# Patient Record
Sex: FEMALE | Race: WHITE | NOT HISPANIC OR LATINO | URBAN - METROPOLITAN AREA
[De-identification: names, ages, dates, MRNs, and addresses within clinical notes are randomized per-mention and may not be internally consistent; named-entity substitution may affect disease eponyms.]

---

## 2022-05-12 ENCOUNTER — TRANSCRIBE ORDERS (OUTPATIENT)
Dept: PHYSICAL THERAPY | Facility: HOSPITAL | Age: 57
End: 2022-05-12

## 2022-05-12 DIAGNOSIS — I89.0 LYMPHEDEMA: Primary | ICD-10-CM

## 2022-06-17 ENCOUNTER — HOSPITAL ENCOUNTER (OUTPATIENT)
Dept: PHYSICAL THERAPY | Facility: HOSPITAL | Age: 57
Setting detail: THERAPIES SERIES
Discharge: HOME | End: 2022-06-17

## 2022-06-17 DIAGNOSIS — R60.9 LIPEDEMA: Primary | ICD-10-CM

## 2022-06-17 PROCEDURE — 97161 PT EVAL LOW COMPLEX 20 MIN: CPT

## 2022-06-17 NOTE — THERAPY DISCHARGE NOTE
Outpatient Physical Therapy Lymphedema Initial Evaluation & Discharge  [facility]     Patient Name: Bonnie Mccormick  : 1965  MRN: [id number]  Today's Date: 2022      Visit Date: 2022    Visit Dx:    ICD-10-CM ICD-9-CM   1. Lipedema  R60.9 782.3       There is no problem list on file for this patient.       Past Medical History:   Diagnosis Date   • Hyperlipidemia    • Lipedema         Past Surgical History:   Procedure Laterality Date   • CHOLECYSTECTOMY     • LAPAROSCOPIC GASTRIC BANDING          Patient History     Row Name 22 1500             History    Chief Complaint Swelling  -KA      Date Current Problem(s) Began --  As long as she can remember  -KA      Brief Description of Current Complaint Pt reports that she has had large legs as long as she can remember.  She was diagnosed with lipedema a few months ago.  She is not having much difficulty with legs as long as she stays on low carb diet or if she walks too long.  She reports legs are sensitive to deep pressure.  She has been wearing compression garments over the past few months, but does not feel that they are a good fit.  She is scheduled for liposuction within the next month.  -KA      Patient/Caregiver Goals Know what to do to help the symptoms  -KA      How has patient tried to help current problem? Compression garments, low carb diet  -KA              Pain     Is your sleep disturbed? --  Sometimes  -KA      Is medication used to assist with sleep? --  Sometimes  -KA      Difficulties with ADL's? Difficulty donning compression stockings, walking long distances  -KA              Fall Risk Assessment    Any falls in the past year: No  -KA              Services    Are you currently receiving Home Health services No  -KA      Do you plan to receive Home Health services in the near future No  -KA              Daily Activities    Primary Language English  -KA      Are you able to read Yes  -KA      Are you able to write Yes  -KA       How does patient learn best? Listening  -KA      Teaching needs identified Management of Condition  -KA      Patient is concerned about/has problems with Difficulty with self care (i.e. bathing, dressing, toileting:  -KA      Does patient have problems with the following? None  -KA      Pt Participated in POC and Goals Yes  -KA              Safety    Are you being hurt, hit, or frightened by anyone at home or in your life? No  -KA      Are you being neglected by a caregiver No  -KA      Have you had any of the following issues with N/A  -KA            User Key  (r) = Recorded By, (t) = Taken By, (c) = Cosigned By    Initials Name Provider Type    Kiana Foster, PT Physical Therapist                 Lymphedema     Row Name 06/17/22 1500             Subjective Pain    Able to rate subjective pain? yes  -KA      Pre-Treatment Pain Level 0  -KA              Lymphedema Assessment    Lymphedema Assessment Comments Pt presents with lipedema affecting BLE  -KA              General ROM    GENERAL ROM COMMENTS Grossly WFLs  -KA              MMT (Manual Muscle Testing)    General MMT Comments Grossly WFLs  -KA              Lymphedema Edema Assessment    Stemmer Sign bilateral:;negative  -KA      [place] Hump bilateral:;negative  -KA      Edema Assessment Comment Soft non-pitting edema affecting BLE, ankles and feet spared  -KA              Skin Changes/Observations    Location/Assessment Lower Extremity  -KA      Lower Extremity Conditions bilateral:;normal  -KA      Lower Extremity Color/Pigment bilateral:;normal  -KA              Lymphedema Sensation    Lymphedema Sensation Reports RLE:;LLE:;normal  -KA      Lymphedema Sensation Tests light touch  -KA      Lymphedema Light Touch WNL  -KA              Lymphedema Measurements    Measurement Type(s) Quick Girth  -KA      Quick Girth Areas Lower extremities  -KA              LLE Quick Girth (cm)    Smallest ankle 23.5 cm  -KA      Largest calf 45.8 cm  -KA       Proximal thigh 82 cm  -KA              RLE Quick Girth (cm)    Smallest ankle 23.5 cm  -KA      Largest calf 45.7 cm  -KA      Proximal thigh 77 cm  -KA      RLE Quick Girth Total 146.2  -KA            User Key  (r) = Recorded By, (t) = Taken By, (c) = Cosigned By    Initials Name Provider Type    Kiana Foster, PT Physical Therapist                                   Therapy Education  Education Details: Discussed diagnoses of lymphedema vs. lipedema; pt's presentation is more consistent with the latter.  Provided compression garment recommendations (Circaid belgica size G and Mediven Comfort Size III Extra Wide calf) as alternative to compression pantyhose due to ease of application and potential for better fit.  Recommended return to lymphedema clinic in the future if she has issues with swelling in feet or ankles or skin issues in legs.  Given: Edema management  Program: New  How Provided: Verbal, Written  Provided to: Patient  Level of Understanding: Verbalized     OP Exercises     Row Name 06/17/22 1500             Subjective Pain    Able to rate subjective pain? yes  -KA      Pre-Treatment Pain Level 0  -KA            User Key  (r) = Recorded By, (t) = Taken By, (c) = Cosigned By    Initials Name Provider Type    Kiana Foster, PT Physical Therapist                               PT OP Goals     Row Name 06/17/22 1600          PT Short Term Goals    STG Date to Achieve 06/17/22  -KA     STG 1 Pt will be measured for compression garments and provided with recommendations.  -KA     STG 1 Progress Met  -KA            Long Term Goals    LTG 1 No long term goals; evaluation only  -KA            Time Calculation    PT Goal Re-Cert Due Date 09/15/22  -KA           User Key  (r) = Recorded By, (t) = Taken By, (c) = Cosigned By    Initials Name Provider Type    Kiana Foster, PT Physical Therapist                 PT Assessment/Plan     Row Name 06/17/22 1625          PT Assessment    Impairments Edema   -     Assessment Comments Pt is 57 year old female referred to lymphedema clinic for evaluation.  Presentation is more consistent with lipedema as seen by soft non-pitting edema, sparing of feet and ankles, negative stemmer signs, absence of fibrotic changes, and good skin integrity.  She is due to undergo liposuction in 3-4 weeks and has already made appropriate diet and exercise changes.  She has compression pantyhose and is compliant with use, but they are too long/uncomfortable.  Recommended circaid Cuca with mediven comfort CCL1 compression stockings as an alternative to compression pantyhose to improve ease of use and comfort.  Educated pt that lipedema sometimes turns into lymphedema and that she should return to clinic for reassessment if she experiences skin changes or swelling in feet and ankles.  Do not recommend CDT at this time.  -     Rehab Potential Other (comment)  Not needed at this time, pt compliant with compression garments and able to don independently.  -     Patient/caregiver participated in establishment of treatment plan and goals Yes  -     Patient would benefit from skilled therapy intervention No  -            PT Plan    PT Frequency One time visit  -     Planned CPT's? PT EVAL LOW COMPLEXITY: 01282  -     Physical Therapy Interventions (Optional Details) patient/family education  -     PT Plan Comments Pt to try combination of compression cuca with compression knee high stocking.  Return to lymphedema for reassessment if/when needed.  -           User Key  (r) = Recorded By, (t) = Taken By, (c) = Cosigned By    Initials Name Provider Type    Kiana Foster, PT Physical Therapist                           Time Calculation:   Start Time: 1515  Stop Time: 1610  Time Calculation (min): 55 min     Therapy Charges for Today     Code Description Service Date Service Provider Modifiers Qty    [id number]  PT EVAL LOW COMPLEXITY 4 6/17/2022 Kiana Hewitt, PT GP 1                 OP PT Discharge Summary  Date of Discharge: 06/17/22  Reason for Discharge: All goals achieved  Outcomes Achieved: Discharge from facility occurred on same date as evluation  Discharge Destination: Home with home program  Discharge Instructions/Additional Comments: Pt to try combination of compression belgica with compression knee high stocking.  Return to lymphedema for reassessment if/when needed.      Kiana Hewitt, PT  6/17/2022

## 2022-06-17 NOTE — THERAPY EVALUATION
Physical Therapy Lymphedema Initial Evaluation  [facility]     Patient Name: Bonnie Mccormick  : 1965  MRN: [id number]  Today's Date: 2022      Visit Date: 2022    Visit Dx:    ICD-10-CM ICD-9-CM   1. Lipedema  R60.9 782.3       There is no problem list on file for this patient.       Past Medical History:   Diagnosis Date   • Hyperlipidemia    • Lipedema         Past Surgical History:   Procedure Laterality Date   • CHOLECYSTECTOMY     • LAPAROSCOPIC GASTRIC BANDING         Visit Dx:    ICD-10-CM ICD-9-CM   1. Lipedema  R60.9 782.3        Patient History     Row Name 22 1500             History    Chief Complaint Swelling  -KA      Date Current Problem(s) Began --  As long as she can remember  -KA      Brief Description of Current Complaint Pt reports that she has had large legs as long as she can remember.  She was diagnosed with lipedema a few months ago.  She is not having much difficulty with legs as long as she stays on low carb diet or if she walks too long.  She reports legs are sensitive to deep pressure.  She has been wearing compression garments over the past few months, but does not feel that they are a good fit.  She is scheduled for liposuction within the next month.  -KA      Patient/Caregiver Goals Know what to do to help the symptoms  -KA      How has patient tried to help current problem? Compression garments, low carb diet  -KA              Pain     Is your sleep disturbed? --  Sometimes  -KA      Is medication used to assist with sleep? --  Sometimes  -KA      Difficulties with ADL's? Difficulty donning compression stockings, walking long distances  -KA              Fall Risk Assessment    Any falls in the past year: No  -KA              Services    Are you currently receiving Home Health services No  -KA      Do you plan to receive Home Health services in the near future No  -KA              Daily Activities    Primary Language English  -KA      Are you able to read Yes   -KA      Are you able to write Yes  -KA      How does patient learn best? Listening  -KA      Teaching needs identified Management of Condition  -KA      Patient is concerned about/has problems with Difficulty with self care (i.e. bathing, dressing, toileting:  -KA      Does patient have problems with the following? None  -KA      Pt Participated in POC and Goals Yes  -KA              Safety    Are you being hurt, hit, or frightened by anyone at home or in your life? No  -KA      Are you being neglected by a caregiver No  -KA      Have you had any of the following issues with N/A  -KA            User Key  (r) = Recorded By, (t) = Taken By, (c) = Cosigned By    Initials Name Provider Type    Kiana Foster, PT Physical Therapist                 Lymphedema     Row Name 06/17/22 1500             Subjective Pain    Able to rate subjective pain? yes  -KA      Pre-Treatment Pain Level 0  -KA              Lymphedema Assessment    Lymphedema Assessment Comments Pt presents with lipedema affecting BLE  -KA              General ROM    GENERAL ROM COMMENTS Grossly WFLs  -KA              MMT (Manual Muscle Testing)    General MMT Comments Grossly WFLs  -KA              Lymphedema Edema Assessment    Stemmer Sign bilateral:;negative  -KA      [place] Hump bilateral:;negative  -KA      Edema Assessment Comment Soft non-pitting edema affecting BLE, ankles and feet spared  -KA              Skin Changes/Observations    Location/Assessment Lower Extremity  -KA      Lower Extremity Conditions bilateral:;normal  -KA      Lower Extremity Color/Pigment bilateral:;normal  -KA              Lymphedema Sensation    Lymphedema Sensation Reports RLE:;LLE:;normal  -KA      Lymphedema Sensation Tests light touch  -KA      Lymphedema Light Touch WNL  -KA              Lymphedema Measurements    Measurement Type(s) Quick Girth  -KA      Quick Girth Areas Lower extremities  -KA              LLE Quick Girth (cm)    Smallest ankle 23.5 cm  -KA       Largest calf 45.8 cm  -KA      Proximal thigh 82 cm  -KA              RLE Quick Girth (cm)    Smallest ankle 23.5 cm  -KA      Largest calf 45.7 cm  -KA      Proximal thigh 77 cm  -KA      RLE Quick Girth Total 146.2  -KA            User Key  (r) = Recorded By, (t) = Taken By, (c) = Cosigned By    Initials Name Provider Type    Kiana Foster, PT Physical Therapist                                Therapy Education  Education Details: Discussed diagnoses of lymphedema vs. lipedema; pt's presentation is more consistent with the latter.  Provided compression garment recommendations (Circaid belgica size G and Mediven Comfort Size III Extra Wide calf) as alternative to compression pantyhose due to ease of application and potential for better fit.  Recommended return to lymphedema clinic in the future if she has issues with swelling in feet or ankles or skin issues in legs.  Given: Edema management  Program: New  How Provided: Verbal, Written  Provided to: Patient  Level of Understanding: Verbalized       OP Exercises     Row Name 06/17/22 1500             Subjective Pain    Able to rate subjective pain? yes  -KA      Pre-Treatment Pain Level 0  -KA            User Key  (r) = Recorded By, (t) = Taken By, (c) = Cosigned By    Initials Name Provider Type    Kiana Foster, PT Physical Therapist                             PT OP Goals     Row Name 06/17/22 1600          PT Short Term Goals    STG Date to Achieve 06/17/22  -KA     STG 1 Pt will be measured for compression garments and provided with recommendations.  -KA     STG 1 Progress Met  -KA            Long Term Goals    LTG 1 No long term goals; evaluation only  -KA            Time Calculation    PT Goal Re-Cert Due Date 09/15/22  -KA           User Key  (r) = Recorded By, (t) = Taken By, (c) = Cosigned By    Initials Name Provider Type    Kiana Foster, PT Physical Therapist                 PT Assessment/Plan     Row Name 06/17/22 1625          PT  Assessment    Impairments Edema  -     Assessment Comments Pt is 57 year old female referred to lymphedema clinic for evaluation.  Presentation is more consistent with lipedema as seen by soft non-pitting edema, sparing of feet and ankles, negative stemmer signs, absence of fibrotic changes, and good skin integrity.  She is due to undergo liposuction in 3-4 weeks and has already made appropriate diet and exercise changes.  She has compression pantyhose and is compliant with use, but they are too long/uncomfortable.  Recommended circaid Cuca with mediven comfort CCL1 compression stockings as an alternative to compression pantyhose to improve ease of use and comfort.  Educated pt that lipedema sometimes turns into lymphedema and that she should return to clinic for reassessment if she experiences skin changes or swelling in feet and ankles.  Do not recommend CDT at this time.  -     Rehab Potential Other (comment)  Not needed at this time, pt compliant with compression garments and able to don independently.  -     Patient/caregiver participated in establishment of treatment plan and goals Yes  -     Patient would benefit from skilled therapy intervention No  -KA            PT Plan    PT Frequency One time visit  -     Planned CPT's? PT EVAL LOW COMPLEXITY: 15015  -     Physical Therapy Interventions (Optional Details) patient/family education  -     PT Plan Comments Pt to try combination of compression cuca with compression knee high stocking.  Return to lymphedema for reassessment if/when needed.  -           User Key  (r) = Recorded By, (t) = Taken By, (c) = Cosigned By    Initials Name Provider Type    Kiana Foster, PT Physical Therapist                             Time Calculation:   Start Time: 1515  Stop Time: 1610  Time Calculation (min): 55 min   Therapy Charges for Today     Code Description Service Date Service Provider Modifiers Qty    [id number]  PT EVAL LOW COMPLEXITY 4  6/17/2022 Kiana Hewitt, PT GP 1                    Kiana Hewitt, PT  6/17/2022